# Patient Record
Sex: FEMALE | Race: BLACK OR AFRICAN AMERICAN | Employment: OTHER | URBAN - METROPOLITAN AREA
[De-identification: names, ages, dates, MRNs, and addresses within clinical notes are randomized per-mention and may not be internally consistent; named-entity substitution may affect disease eponyms.]

---

## 2017-01-02 ENCOUNTER — APPOINTMENT (OUTPATIENT)
Dept: GENERAL RADIOLOGY | Age: 77
DRG: 004 | End: 2017-01-02
Attending: HOSPITALIST
Payer: MEDICARE

## 2017-01-02 ENCOUNTER — APPOINTMENT (OUTPATIENT)
Dept: GENERAL RADIOLOGY | Age: 77
DRG: 004 | End: 2017-01-02
Attending: NURSE PRACTITIONER
Payer: MEDICARE

## 2017-01-02 PROCEDURE — 71010 XR CHEST PORT: CPT

## 2017-01-02 PROCEDURE — 74000 XR ABD PORT  1 V: CPT

## 2017-01-03 ENCOUNTER — APPOINTMENT (OUTPATIENT)
Dept: GENERAL RADIOLOGY | Age: 77
DRG: 004 | End: 2017-01-03
Attending: NURSE PRACTITIONER
Payer: MEDICARE

## 2017-01-03 PROCEDURE — 71010 XR CHEST PORT: CPT

## 2017-01-04 ENCOUNTER — APPOINTMENT (OUTPATIENT)
Dept: GENERAL RADIOLOGY | Age: 77
DRG: 004 | End: 2017-01-04
Attending: NURSE PRACTITIONER
Payer: MEDICARE

## 2017-01-04 PROBLEM — R53.81 DEBILITY: Status: ACTIVE | Noted: 2017-01-04

## 2017-01-04 PROCEDURE — 71010 XR CHEST PORT: CPT

## 2017-01-05 ENCOUNTER — APPOINTMENT (OUTPATIENT)
Dept: GENERAL RADIOLOGY | Age: 77
DRG: 004 | End: 2017-01-05
Attending: NURSE PRACTITIONER
Payer: MEDICARE

## 2017-01-05 PROCEDURE — 71010 XR CHEST PORT: CPT

## 2017-01-06 ENCOUNTER — APPOINTMENT (OUTPATIENT)
Dept: GENERAL RADIOLOGY | Age: 77
DRG: 004 | End: 2017-01-06
Attending: NURSE PRACTITIONER
Payer: MEDICARE

## 2017-01-06 PROCEDURE — 71010 XR CHEST PORT: CPT

## 2017-01-07 ENCOUNTER — APPOINTMENT (OUTPATIENT)
Dept: GENERAL RADIOLOGY | Age: 77
DRG: 004 | End: 2017-01-07
Attending: NURSE PRACTITIONER
Payer: MEDICARE

## 2017-01-07 PROCEDURE — 71010 XR CHEST PORT: CPT

## 2017-01-08 ENCOUNTER — APPOINTMENT (OUTPATIENT)
Dept: GENERAL RADIOLOGY | Age: 77
DRG: 004 | End: 2017-01-08
Attending: NURSE PRACTITIONER
Payer: MEDICARE

## 2017-01-08 PROCEDURE — 71010 XR CHEST PORT: CPT

## 2017-01-09 ENCOUNTER — APPOINTMENT (OUTPATIENT)
Dept: GENERAL RADIOLOGY | Age: 77
DRG: 004 | End: 2017-01-09
Attending: NURSE PRACTITIONER
Payer: MEDICARE

## 2017-01-09 PROBLEM — N18.6 ESRD (END STAGE RENAL DISEASE) (HCC): Chronic | Status: ACTIVE | Noted: 2017-01-09

## 2017-01-09 PROCEDURE — 71010 XR CHEST PORT: CPT

## 2017-01-10 ENCOUNTER — APPOINTMENT (OUTPATIENT)
Dept: GENERAL RADIOLOGY | Age: 77
DRG: 004 | End: 2017-01-10
Attending: NURSE PRACTITIONER
Payer: MEDICARE

## 2017-01-10 PROCEDURE — 71010 XR CHEST PORT: CPT

## 2017-01-11 ENCOUNTER — APPOINTMENT (OUTPATIENT)
Dept: GENERAL RADIOLOGY | Age: 77
DRG: 004 | End: 2017-01-11
Attending: NURSE PRACTITIONER
Payer: MEDICARE

## 2017-01-11 PROCEDURE — 71010 XR CHEST PORT: CPT

## 2017-01-12 ENCOUNTER — APPOINTMENT (OUTPATIENT)
Dept: GENERAL RADIOLOGY | Age: 77
DRG: 004 | End: 2017-01-12
Attending: NURSE PRACTITIONER
Payer: MEDICARE

## 2017-01-12 ENCOUNTER — APPOINTMENT (OUTPATIENT)
Dept: GENERAL RADIOLOGY | Age: 77
DRG: 004 | End: 2017-01-12
Attending: INTERNAL MEDICINE
Payer: MEDICARE

## 2017-01-12 PROCEDURE — 71010 XR CHEST PORT: CPT

## 2017-01-13 ENCOUNTER — APPOINTMENT (OUTPATIENT)
Dept: GENERAL RADIOLOGY | Age: 77
DRG: 004 | End: 2017-01-13
Attending: NURSE PRACTITIONER
Payer: MEDICARE

## 2017-01-13 PROCEDURE — 71010 XR CHEST PORT: CPT

## 2017-01-14 ENCOUNTER — APPOINTMENT (OUTPATIENT)
Dept: GENERAL RADIOLOGY | Age: 77
DRG: 004 | End: 2017-01-14
Attending: NURSE PRACTITIONER
Payer: MEDICARE

## 2017-01-14 PROCEDURE — 71010 XR CHEST PORT: CPT

## 2017-01-15 ENCOUNTER — APPOINTMENT (OUTPATIENT)
Dept: GENERAL RADIOLOGY | Age: 77
DRG: 004 | End: 2017-01-15
Attending: NURSE PRACTITIONER
Payer: MEDICARE

## 2017-01-15 PROCEDURE — 71010 XR CHEST PORT: CPT

## 2017-01-16 ENCOUNTER — APPOINTMENT (OUTPATIENT)
Dept: GENERAL RADIOLOGY | Age: 77
DRG: 004 | End: 2017-01-16
Attending: NURSE PRACTITIONER
Payer: MEDICARE

## 2017-01-16 PROCEDURE — 71010 XR CHEST PORT: CPT

## 2017-01-17 ENCOUNTER — APPOINTMENT (OUTPATIENT)
Dept: GENERAL RADIOLOGY | Age: 77
DRG: 004 | End: 2017-01-17
Attending: NURSE PRACTITIONER
Payer: MEDICARE

## 2017-01-17 PROCEDURE — 71010 XR CHEST PORT: CPT

## 2017-01-18 ENCOUNTER — APPOINTMENT (OUTPATIENT)
Dept: GENERAL RADIOLOGY | Age: 77
DRG: 004 | End: 2017-01-18
Attending: NURSE PRACTITIONER
Payer: MEDICARE

## 2017-01-18 PROCEDURE — 71010 XR CHEST PORT: CPT

## 2017-01-19 ENCOUNTER — APPOINTMENT (OUTPATIENT)
Dept: GENERAL RADIOLOGY | Age: 77
DRG: 004 | End: 2017-01-19
Attending: NURSE PRACTITIONER
Payer: MEDICARE

## 2017-01-19 PROCEDURE — 71010 XR CHEST PORT: CPT

## 2017-01-20 ENCOUNTER — APPOINTMENT (OUTPATIENT)
Dept: GENERAL RADIOLOGY | Age: 77
DRG: 004 | End: 2017-01-20
Attending: NURSE PRACTITIONER
Payer: MEDICARE

## 2017-01-20 PROCEDURE — 71010 XR CHEST PORT: CPT

## 2017-01-21 ENCOUNTER — APPOINTMENT (OUTPATIENT)
Dept: GENERAL RADIOLOGY | Age: 77
DRG: 004 | End: 2017-01-21
Attending: NURSE PRACTITIONER
Payer: MEDICARE

## 2017-01-21 PROCEDURE — 71010 XR CHEST PORT: CPT

## 2017-01-22 ENCOUNTER — APPOINTMENT (OUTPATIENT)
Dept: GENERAL RADIOLOGY | Age: 77
DRG: 004 | End: 2017-01-22
Attending: NURSE PRACTITIONER
Payer: MEDICARE

## 2017-01-22 PROCEDURE — 71010 XR CHEST PORT: CPT

## 2017-01-23 ENCOUNTER — APPOINTMENT (OUTPATIENT)
Dept: GENERAL RADIOLOGY | Age: 77
DRG: 004 | End: 2017-01-23
Attending: NURSE PRACTITIONER
Payer: MEDICARE

## 2017-01-23 PROCEDURE — 71010 XR CHEST PORT: CPT

## 2017-01-24 ENCOUNTER — APPOINTMENT (OUTPATIENT)
Dept: GENERAL RADIOLOGY | Age: 77
DRG: 004 | End: 2017-01-24
Attending: NURSE PRACTITIONER
Payer: MEDICARE

## 2017-01-24 PROCEDURE — 71010 XR CHEST PORT: CPT

## 2017-01-25 ENCOUNTER — APPOINTMENT (OUTPATIENT)
Dept: GENERAL RADIOLOGY | Age: 77
DRG: 004 | End: 2017-01-25
Attending: NURSE PRACTITIONER
Payer: MEDICARE

## 2017-01-25 PROCEDURE — 71010 XR CHEST PORT: CPT

## 2017-01-26 ENCOUNTER — APPOINTMENT (OUTPATIENT)
Dept: GENERAL RADIOLOGY | Age: 77
DRG: 004 | End: 2017-01-26
Attending: NURSE PRACTITIONER
Payer: MEDICARE

## 2017-01-26 PROCEDURE — 71010 XR CHEST PORT: CPT

## 2017-01-27 ENCOUNTER — ANESTHESIA EVENT (OUTPATIENT)
Dept: SURGERY | Age: 77
DRG: 004 | End: 2017-01-27
Payer: MEDICARE

## 2017-01-27 ENCOUNTER — ANESTHESIA (OUTPATIENT)
Dept: SURGERY | Age: 77
DRG: 004 | End: 2017-01-27
Payer: MEDICARE

## 2017-01-27 ENCOUNTER — APPOINTMENT (OUTPATIENT)
Dept: GENERAL RADIOLOGY | Age: 77
DRG: 004 | End: 2017-01-27
Attending: NURSE PRACTITIONER
Payer: MEDICARE

## 2017-01-27 PROBLEM — L98.429 SACRAL ULCER (HCC): Status: ACTIVE | Noted: 2017-01-27

## 2017-01-27 PROCEDURE — 71010 XR CHEST PORT: CPT

## 2017-01-28 ENCOUNTER — APPOINTMENT (OUTPATIENT)
Dept: GENERAL RADIOLOGY | Age: 77
DRG: 004 | End: 2017-01-28
Attending: NURSE PRACTITIONER
Payer: MEDICARE

## 2017-01-28 PROCEDURE — 74011000250 HC RX REV CODE- 250

## 2017-01-28 PROCEDURE — 71010 XR CHEST PORT: CPT

## 2017-01-28 PROCEDURE — 74011250636 HC RX REV CODE- 250/636

## 2017-01-28 RX ORDER — SODIUM CHLORIDE 9 MG/ML
INJECTION, SOLUTION INTRAVENOUS
Status: DISCONTINUED | OUTPATIENT
Start: 2017-01-28 | End: 2017-01-28 | Stop reason: HOSPADM

## 2017-01-28 RX ORDER — FENTANYL CITRATE 50 UG/ML
INJECTION, SOLUTION INTRAMUSCULAR; INTRAVENOUS AS NEEDED
Status: DISCONTINUED | OUTPATIENT
Start: 2017-01-28 | End: 2017-01-28 | Stop reason: HOSPADM

## 2017-01-28 RX ORDER — CEFAZOLIN SODIUM IN 0.9 % NACL 2 G/100 ML
PLASTIC BAG, INJECTION (ML) INTRAVENOUS AS NEEDED
Status: DISCONTINUED | OUTPATIENT
Start: 2017-01-28 | End: 2017-01-28 | Stop reason: HOSPADM

## 2017-01-28 RX ORDER — GLYCOPYRROLATE 0.2 MG/ML
INJECTION INTRAMUSCULAR; INTRAVENOUS AS NEEDED
Status: DISCONTINUED | OUTPATIENT
Start: 2017-01-28 | End: 2017-01-28 | Stop reason: HOSPADM

## 2017-01-28 RX ORDER — VECURONIUM BROMIDE FOR INJECTION 1 MG/ML
INJECTION, POWDER, LYOPHILIZED, FOR SOLUTION INTRAVENOUS AS NEEDED
Status: DISCONTINUED | OUTPATIENT
Start: 2017-01-28 | End: 2017-01-28 | Stop reason: HOSPADM

## 2017-01-28 RX ADMIN — FENTANYL CITRATE 100 MCG: 50 INJECTION, SOLUTION INTRAMUSCULAR; INTRAVENOUS at 09:55

## 2017-01-28 RX ADMIN — VECURONIUM BROMIDE FOR INJECTION 10 MG: 1 INJECTION, POWDER, LYOPHILIZED, FOR SOLUTION INTRAVENOUS at 09:50

## 2017-01-28 RX ADMIN — SODIUM CHLORIDE: 9 INJECTION, SOLUTION INTRAVENOUS at 09:45

## 2017-01-28 RX ADMIN — GLYCOPYRROLATE 0.2 MG: 0.2 INJECTION INTRAMUSCULAR; INTRAVENOUS at 10:29

## 2017-01-28 RX ADMIN — VECURONIUM BROMIDE FOR INJECTION 10 MG: 1 INJECTION, POWDER, LYOPHILIZED, FOR SOLUTION INTRAVENOUS at 11:04

## 2017-01-28 RX ADMIN — Medication 2 G: at 10:04

## 2017-01-28 NOTE — ANESTHESIA PREPROCEDURE EVALUATION
Anesthetic History   No history of anesthetic complications            Review of Systems / Medical History  Patient summary reviewed and pertinent labs reviewed    Pulmonary  Within defined limits                 Neuro/Psych   Within defined limits           Cardiovascular    Hypertension              Exercise tolerance: >4 METS     GI/Hepatic/Renal  Within defined limits              Endo/Other    Diabetes         Other Findings   Comments: Current Smoker? NO       Elective Surgery? Yes       Abstained from smoking 24 hours prior to anesthesia? N/A    Risk Factors for Postoperative nausea/vomiting:       History of postoperative nausea/vomiting? NO       Female? YES       Motion sickness? NO       Intended opioid administration for postoperative analgesia? NO         Physical Exam    Airway    TM Distance: 4 - 6 cm    Mouth opening: Normal  Intubated   Cardiovascular  Regular rate and rhythm,  S1 and S2 normal,  no murmur, click, rub, or gallop  Rhythm: regular  Rate: normal         Dental    Dentition: Edentulous     Pulmonary  Breath sounds clear to auscultation               Abdominal  GI exam deferred       Other Findings            Anesthetic Plan    ASA: 4  Anesthesia type: general            Anesthetic plan and risks discussed with: Legal guardian      D/W REBEL last evening by phone.

## 2017-01-29 ENCOUNTER — APPOINTMENT (OUTPATIENT)
Dept: GENERAL RADIOLOGY | Age: 77
DRG: 004 | End: 2017-01-29
Attending: SURGERY
Payer: MEDICARE

## 2017-01-29 PROCEDURE — 71010 XR CHEST PORT: CPT

## 2017-01-29 NOTE — ANESTHESIA POSTPROCEDURE EVALUATION
Post-Anesthesia Evaluation and Assessment    Patient: Aristides Young MRN: [id number]  SSN: [id number]    YOB: 1940  Age: 64 y.o. Sex: female       Cardiovascular Function/Vital Signs  Visit Vitals    /64    Pulse (!) 114    Temp 37.2 °C (98.9 °F) (Oral)    Resp 26    Ht 5' 6\" (1.676 m)    Wt 54.4 kg (119 lb 14.4 oz)    SpO2 99%    BMI 19.35 kg/m2       Patient is status post general anesthesia for Procedure(s):  TRACHEOSTOMY  PERCUTANEOUS ENDOSCOPIC GASTROSTOMY TUBE INSERTION. Nausea/Vomiting: None    Postoperative hydration reviewed and adequate. Pain:  Pain Scale 1: Adult Nonverbal Pain Scale (01/28/17 1600)  Pain Intensity 1: 0 (01/28/17 0400)   Managed    Neurological Status:   Neuro  Neurologic State: Eyes open to stimulus (01/28/17 1600)  Orientation Level: Unable to verbalize (01/28/17 1600)  Cognition: No command following (01/28/17 1600)  Speech: Intubated (01/28/17 1600)  Assessment L Pupil: Brisk;Round (01/28/17 1600)  Size L Pupil (mm): 2 (01/28/17 1600)  Assessment R Pupil: Brisk;Round (01/28/17 1600)  Size R Pupil (mm): 2 (01/28/17 1600)  LUE Motor Response: Withdraws (01/28/17 1600)  LLE Motor Response: Withdraws (01/28/17 1600)  RUE Motor Response: Withdraws (01/28/17 1600)  RLE Motor Response: Withdraws (01/28/17 1600)   At baseline    Mental Status and Level of Consciousness: at baseline     Pulmonary Status:   O2 Device: Tracheostomy (01/28/17 1600)   Adequate oxygenation and trach in place    Complications related to anesthesia: None    Post-anesthesia assessment completed.  No concerns    Signed By: Nahomy Lockett MD     January 28, 2017

## 2017-01-30 ENCOUNTER — APPOINTMENT (OUTPATIENT)
Dept: GENERAL RADIOLOGY | Age: 77
DRG: 004 | End: 2017-01-30
Attending: SURGERY
Payer: MEDICARE

## 2017-01-30 PROCEDURE — 71010 XR CHEST PORT: CPT

## 2017-01-31 ENCOUNTER — APPOINTMENT (OUTPATIENT)
Dept: GENERAL RADIOLOGY | Age: 77
DRG: 004 | End: 2017-01-31
Attending: SURGERY
Payer: MEDICARE

## 2017-01-31 PROCEDURE — 71010 XR CHEST PORT: CPT

## 2017-02-01 ENCOUNTER — APPOINTMENT (OUTPATIENT)
Dept: GENERAL RADIOLOGY | Age: 77
DRG: 004 | End: 2017-02-01
Attending: SURGERY
Payer: MEDICARE

## 2017-02-01 PROCEDURE — 71010 XR CHEST PORT: CPT
